# Patient Record
Sex: MALE | Race: WHITE | Employment: OTHER | URBAN - METROPOLITAN AREA
[De-identification: names, ages, dates, MRNs, and addresses within clinical notes are randomized per-mention and may not be internally consistent; named-entity substitution may affect disease eponyms.]

---

## 2017-04-18 ENCOUNTER — TRANSCRIBE ORDERS (OUTPATIENT)
Dept: ADMINISTRATIVE | Age: 37
End: 2017-04-18

## 2017-04-18 ENCOUNTER — HOSPITAL ENCOUNTER (OUTPATIENT)
Dept: RADIOLOGY | Age: 37
Discharge: HOME | End: 2017-04-18
Payer: COMMERCIAL

## 2017-04-18 DIAGNOSIS — S22.42XA CLOSED FRACTURE OF TWO RIBS, LEFT, INITIAL ENCOUNTER: ICD-10-CM

## 2017-04-18 DIAGNOSIS — S22.42XA CLOSED FRACTURE OF TWO RIBS, LEFT, INITIAL ENCOUNTER: Primary | ICD-10-CM

## 2017-04-18 PROCEDURE — 71020 HB CHEST X-RAY 2VW FRONTAL&LATL: CPT

## 2018-01-05 ENCOUNTER — TRANSCRIBE ORDERS (OUTPATIENT)
Dept: ADMINISTRATIVE | Facility: HOSPITAL | Age: 38
End: 2018-01-05

## 2018-01-05 ENCOUNTER — GENERIC CONVERSION - ENCOUNTER (OUTPATIENT)
Dept: OTHER | Facility: OTHER | Age: 38
End: 2018-01-05

## 2018-01-05 ENCOUNTER — APPOINTMENT (OUTPATIENT)
Dept: RADIOLOGY | Age: 38
End: 2018-01-05
Payer: COMMERCIAL

## 2018-01-05 DIAGNOSIS — I10 HYPERTENSION, UNSPECIFIED TYPE: Primary | ICD-10-CM

## 2018-01-05 DIAGNOSIS — R06.89 HYPOVENTILATION, IDIOPATHIC: Primary | ICD-10-CM

## 2018-01-05 DIAGNOSIS — R06.89 HYPOVENTILATION, IDIOPATHIC: ICD-10-CM

## 2018-01-05 PROCEDURE — 71046 X-RAY EXAM CHEST 2 VIEWS: CPT

## 2018-01-19 ENCOUNTER — HOSPITAL ENCOUNTER (OUTPATIENT)
Dept: NON INVASIVE DIAGNOSTICS | Facility: CLINIC | Age: 38
Discharge: HOME | End: 2018-01-19
Payer: COMMERCIAL

## 2018-01-19 ENCOUNTER — GENERIC CONVERSION - ENCOUNTER (OUTPATIENT)
Dept: OTHER | Facility: OTHER | Age: 38
End: 2018-01-19

## 2018-01-19 DIAGNOSIS — I10 HYPERTENSION, UNSPECIFIED TYPE: ICD-10-CM

## 2018-01-19 LAB
ARRHY DURING EX: NORMAL
CHEST PAIN STATEMENT: NORMAL
MAX DIASTOLIC BP: 100 MMHG
MAX HEART RATE: 179 BPM
MAX PREDICTED HEART RATE: 183 BPM
MAX. SYSTOLIC BP: 210 MMHG
PROTOCOL NAME: NORMAL
REASON FOR TERMINATION: NORMAL
TARGET HR FORMULA: NORMAL
TIME IN EXERCISE PHASE: NORMAL

## 2018-01-19 PROCEDURE — 93017 CV STRESS TEST TRACING ONLY: CPT

## 2018-01-19 PROCEDURE — 93306 TTE W/DOPPLER COMPLETE: CPT

## 2021-01-06 ENCOUNTER — NURSE TRIAGE (OUTPATIENT)
Dept: OTHER | Facility: OTHER | Age: 41
End: 2021-01-06

## 2021-01-06 NOTE — TELEPHONE ENCOUNTER
Regarding: covid/symptomatic  ----- Message from [facility] sent at 1/6/2021  2:29 PM EST -----  "I am experiencing a cough and was at the hospital the other day for a fracture and not sure if I picked up something there "    covid request  Lm to call back if assistance still needed

## 2021-04-08 DIAGNOSIS — Z23 ENCOUNTER FOR IMMUNIZATION: ICD-10-CM
